# Patient Record
Sex: FEMALE | Race: WHITE | NOT HISPANIC OR LATINO | Employment: FULL TIME | URBAN - METROPOLITAN AREA
[De-identification: names, ages, dates, MRNs, and addresses within clinical notes are randomized per-mention and may not be internally consistent; named-entity substitution may affect disease eponyms.]

---
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
## 2023-12-31 ENCOUNTER — APPOINTMENT (OUTPATIENT)
Dept: GENERAL RADIOLOGY | Facility: CLINIC | Age: 18
End: 2023-12-31
Attending: EMERGENCY MEDICINE
Payer: COMMERCIAL

## 2023-12-31 ENCOUNTER — HOSPITAL ENCOUNTER (EMERGENCY)
Facility: CLINIC | Age: 18
Discharge: HOME | End: 2023-12-31
Attending: EMERGENCY MEDICINE | Admitting: EMERGENCY MEDICINE
Payer: COMMERCIAL

## 2023-12-31 VITALS
SYSTOLIC BLOOD PRESSURE: 104 MMHG | RESPIRATION RATE: 22 BRPM | HEART RATE: 101 BPM | TEMPERATURE: 97.8 F | BODY MASS INDEX: 19.63 KG/M2 | WEIGHT: 115 LBS | DIASTOLIC BLOOD PRESSURE: 91 MMHG | OXYGEN SATURATION: 98 % | HEIGHT: 64 IN

## 2023-12-31 DIAGNOSIS — S42.022A DISPLACED FRACTURE OF SHAFT OF LEFT CLAVICLE, INITIAL ENCOUNTER FOR CLOSED FRACTURE: ICD-10-CM
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
## 2023-12-31 DIAGNOSIS — S80.01XA CONTUSION OF RIGHT KNEE, INITIAL ENCOUNTER: ICD-10-CM

## 2023-12-31 LAB
HOLD SPECIMEN: NORMAL

## 2023-12-31 PROCEDURE — 23500 CLTX CLAVICULAR FX W/O MNPJ: CPT | Mod: LT | Performed by: EMERGENCY MEDICINE

## 2023-12-31 PROCEDURE — 99284 EMERGENCY DEPT VISIT MOD MDM: CPT | Mod: 57 | Performed by: EMERGENCY MEDICINE

## 2023-12-31 PROCEDURE — 250N000011 HC RX IP 250 OP 636: Performed by: EMERGENCY MEDICINE

## 2023-12-31 PROCEDURE — 73030 X-RAY EXAM OF SHOULDER: CPT | Mod: LT

## 2023-12-31 PROCEDURE — 250N000013 HC RX MED GY IP 250 OP 250 PS 637: Performed by: EMERGENCY MEDICINE

## 2023-12-31 PROCEDURE — 96376 TX/PRO/DX INJ SAME DRUG ADON: CPT | Mod: 59 | Performed by: EMERGENCY MEDICINE

## 2023-12-31 PROCEDURE — 73000 X-RAY EXAM OF COLLAR BONE: CPT | Mod: LT

## 2023-12-31 PROCEDURE — 73560 X-RAY EXAM OF KNEE 1 OR 2: CPT | Mod: RT

## 2023-12-31 PROCEDURE — 96374 THER/PROPH/DIAG INJ IV PUSH: CPT | Mod: 59 | Performed by: EMERGENCY MEDICINE

## 2023-12-31 PROCEDURE — 23500 CLTX CLAVICULAR FX W/O MNPJ: CPT | Mod: 54 | Performed by: EMERGENCY MEDICINE

## 2023-12-31 PROCEDURE — 99284 EMERGENCY DEPT VISIT MOD MDM: CPT | Mod: 25 | Performed by: EMERGENCY MEDICINE

## 2023-12-31 PROCEDURE — 96372 THER/PROPH/DIAG INJ SC/IM: CPT | Performed by: EMERGENCY MEDICINE

## 2023-12-31 RX ORDER — OXYCODONE AND ACETAMINOPHEN 5; 325 MG/1; MG/1
1-2 TABLET ORAL EVERY 6 HOURS PRN
Qty: 12 TABLET | Refills: 0 | Status: SHIPPED | OUTPATIENT
Start: 2023-12-31 | End: 2024-01-03

## 2023-12-31 RX ORDER — HYDROMORPHONE HYDROCHLORIDE 1 MG/ML
0.5 INJECTION, SOLUTION INTRAMUSCULAR; INTRAVENOUS; SUBCUTANEOUS
Status: COMPLETED | OUTPATIENT
Start: 2023-12-31 | End: 2023-12-31

## 2023-12-31 RX ORDER — HYDROMORPHONE HYDROCHLORIDE 1 MG/ML
0.5 INJECTION, SOLUTION INTRAMUSCULAR; INTRAVENOUS; SUBCUTANEOUS ONCE
Status: COMPLETED | OUTPATIENT
Start: 2023-12-31 | End: 2023-12-31

## 2023-12-31 RX ORDER — ONDANSETRON 4 MG/1
4 TABLET, ORALLY DISINTEGRATING ORAL ONCE
Status: COMPLETED | OUTPATIENT
Start: 2023-12-31 | End: 2023-12-31

## 2023-12-31 RX ORDER — ONDANSETRON 4 MG/1
4 TABLET, ORALLY DISINTEGRATING ORAL EVERY 8 HOURS PRN
Qty: 10 TABLET | Refills: 0 | Status: SHIPPED | OUTPATIENT
Start: 2023-12-31

## 2023-12-31 RX ORDER — OXYCODONE AND ACETAMINOPHEN 5; 325 MG/1; MG/1
2 TABLET ORAL ONCE
Status: COMPLETED | OUTPATIENT
Start: 2023-12-31 | End: 2023-12-31

## 2023-12-31 RX ADMIN — ONDANSETRON 4 MG: 4 TABLET, ORALLY DISINTEGRATING ORAL at 21:58

## 2023-12-31 RX ADMIN — OXYCODONE HYDROCHLORIDE AND ACETAMINOPHEN 2 TABLET: 5; 325 TABLET ORAL at 21:37

## 2023-12-31 RX ADMIN — HYDROMORPHONE HYDROCHLORIDE 0.5 MG: 1 INJECTION, SOLUTION INTRAMUSCULAR; INTRAVENOUS; SUBCUTANEOUS at 20:41

## 2023-12-31 RX ADMIN — HYDROMORPHONE HYDROCHLORIDE 0.5 MG: 1 INJECTION, SOLUTION INTRAMUSCULAR; INTRAVENOUS; SUBCUTANEOUS at 21:57

## 2023-12-31 RX ADMIN — HYDROMORPHONE HYDROCHLORIDE 0.5 MG: 1 INJECTION, SOLUTION INTRAMUSCULAR; INTRAVENOUS; SUBCUTANEOUS at 19:41

## 2023-12-31 ASSESSMENT — ACTIVITIES OF DAILY LIVING (ADL): ADLS_ACUITY_SCORE: 35

## 2024-01-01 NOTE — ED PROVIDER NOTES
History     Chief Complaint   Patient presents with    Fall     Injuring collar bone while skiing      HPI  History per patient, review of [facility] EMR and Care Everywhere EMR.    Win Simeon is a 18 year old female who presents emergency department with a friend after a fall while snowboarding a short time ago, falling onto the left shoulder area sustaining injury and pain in the left anterior shoulder and clavicle area.  She also has right anterior knee pain without swelling and is able to ambulate and bear weight on the knee.  Unsure of mechanism of injury of the right knee.  There is no knee swelling.  No right lower extremity or left upper extremity neurovascular CMS dysfunction or deficit.  She was helmeted and denies head injury and had no neck or back injury.  She has no shortness of breath, cough or hemoptysis.  No abdominal, hip or pelvis trauma. No other pertinent history or acute complaints or concerns.    Allergies:  No Known Allergies    Problem List:    Patient Active Problem List    Diagnosis Date Noted    Hiatal hernia 06/04/2021     Priority: Medium    Weight loss 05/14/2021     Priority: Medium    Irritable bowel syndrome with diarrhea 05/14/2021     Priority: Medium        Past Medical History:    Past Medical History:   Diagnosis Date    ADHD (attention deficit hyperactivity disorder)     Depression        Past Surgical History:    Past Surgical History:   Procedure Laterality Date    COLONOSCOPY N/A 5/26/2021    Procedure: COLONOSCOPY, WITH BIOPSY;  Surgeon: Geovanna Torrez MD;  Location: [facility]     ESOPHAGOSCOPY, GASTROSCOPY, DUODENOSCOPY (EGD), COMBINED N/A 5/26/2021    Procedure: ESOPHAGOGASTRODUODENOSCOPY, WITH BIOPSY;  Surgeon: Geovanna Torrez MD;  Location: [facility]        Family History:    No family history on file.    Social History:  Marital Status:  Single [1]        Medications:    ondansetron (ZOFRAN ODT) 4 MG ODT tab  oxyCODONE-acetaminophen (PERCOCET) 5-325 MG  "tablet  amphetamine-dextroamphetamine (ADDERALL) 10 MG tablet  hyoscyamine (LEVSIN) 0.125 MG tablet  pantoprazole (PROTONIX) 40 MG EC tablet      Review of Systems  As mentioned in the HPI, in addition focused review of systems was negative.    Physical Exam   BP: 120/80  Pulse: 102  Temp: 98  F (36.7  C)  Resp: 24  Height: 162.6 cm (5' 4\")  Weight: 52.2 kg (115 lb)  SpO2: 94 %      Physical Exam  Vitals and nursing note reviewed.   Constitutional:       General: She is in acute distress.      Appearance: Normal appearance. She is well-developed. She is not ill-appearing, toxic-appearing or diaphoretic.      Comments: Appears anxious and distressed due to pain, otherwise appears normal.   HENT:      Head: Normocephalic. Abrasion present. No raccoon eyes, Barahona's sign or contusion.      Right Ear: External ear normal.      Left Ear: External ear normal.      Nose: Nose normal.      Mouth/Throat:      Mouth: Mucous membranes are moist.   Eyes:      General: No scleral icterus.     Extraocular Movements: Extraocular movements intact.      Conjunctiva/sclera: Conjunctivae normal.   Neck:      Trachea: No tracheal deviation.   Cardiovascular:      Rate and Rhythm: Normal rate and regular rhythm.      Pulses: Normal pulses.      Heart sounds: Normal heart sounds. No murmur heard.     No friction rub. No gallop.   Pulmonary:      Effort: Pulmonary effort is normal. No respiratory distress.      Breath sounds: Normal breath sounds. No stridor. No wheezing, rhonchi or rales.   Chest:      Chest wall: No tenderness (Tenderness is located in the mid clavicle area with palpable deformity, skin intact).       Abdominal:      General: Bowel sounds are normal. There is no distension.      Palpations: Abdomen is soft.      Tenderness: There is no abdominal tenderness. There is no guarding or rebound.   Musculoskeletal:         General: No swelling or deformity.      Left shoulder: No swelling, deformity, effusion, tenderness " (Clavicular tenderness, true tenderness does not appear to be in the shoulder joint itself), bony tenderness or crepitus. Decreased range of motion. Normal strength. Normal pulse.        Arms:       Cervical back: Full passive range of motion without pain, normal range of motion and neck supple. No torticollis, tenderness or crepitus. No pain with movement, spinous process tenderness or muscular tenderness. Normal range of motion.      Thoracic back: Normal. No signs of trauma, tenderness or bony tenderness. Normal range of motion.      Lumbar back: Normal. No signs of trauma, tenderness or bony tenderness. Normal range of motion.      Right knee: No swelling, deformity, effusion, erythema, ecchymosis or crepitus. Normal range of motion. Tenderness (Poorly localized anterior knee tenderness with no contusions, abrasions, effusion or crepitance.) present. No LCL laxity, MCL laxity, ACL laxity or PCL laxity. Normal alignment and normal patellar mobility. Normal pulse.      Instability Tests: Anterior drawer test negative. Posterior drawer test negative.      Right lower leg: Normal. No edema.      Left lower leg: Normal. No edema.        Legs:    Skin:     General: Skin is warm and dry.      Coloration: Skin is not pale.      Findings: No erythema or rash.   Neurological:      General: No focal deficit present.      Mental Status: She is alert and oriented to person, place, and time.      GCS: GCS eye subscore is 4. GCS verbal subscore is 5. GCS motor subscore is 6.      Cranial Nerves: No cranial nerve deficit.      Sensory: Sensation is intact. No sensory deficit.      Motor: Motor function is intact. No weakness.      Coordination: Coordination normal.   Psychiatric:         Behavior: Behavior normal.      Comments: Anxious affect.         ED Course               Procedures            Results for orders placed or performed during the hospital encounter of 12/31/23   XR Shoulder Left 2 Views     Status: None     Narrative    EXAM: XR CLAVICLE LEFT 2 VIEWS, XR SHOULDER LEFT 2 VIEWS  LOCATION: [facility]  DATE: 12/31/2023    INDICATION: Fall, trauma, pain  COMPARISON: None.      Impression    IMPRESSION: Acute fracture of the mid left clavicle with greater than 1 bone width caudal and approximately 1.5 cm medial displacement. The acromioclavicular and glenohumeral joint spaces are preserved and normally aligned.    NOTE: ABNORMAL REPORT    THE DICTATION ABOVE DESCRIBES AN ABNORMALITY FOR WHICH FOLLOW-UP IS NEEDED.    XR Clavicle Left 2 Views     Status: None    Narrative    EXAM: XR CLAVICLE LEFT 2 VIEWS, XR SHOULDER LEFT 2 VIEWS  LOCATION: [facility]  DATE: 12/31/2023    INDICATION: Fall, trauma, pain  COMPARISON: None.      Impression    IMPRESSION: Acute fracture of the mid left clavicle with greater than 1 bone width caudal and approximately 1.5 cm medial displacement. The acromioclavicular and glenohumeral joint spaces are preserved and normally aligned.    NOTE: ABNORMAL REPORT    THE DICTATION ABOVE DESCRIBES AN ABNORMALITY FOR WHICH FOLLOW-UP IS NEEDED.    XR Knee Right 1/2 Views     Status: None    Narrative    EXAM: XR KNEE RIGHT 1/2 VIEWS  LOCATION: [facility]  DATE: 12/31/2023    INDICATION: Fall, trauma, pain  COMPARISON: None.      Impression    IMPRESSION: Normal joint spaces and alignment. No fracture or joint effusion.   [place] Draw     Status: None    Narrative    The following orders were created for panel order [place] Draw.  Procedure                               Abnormality         Status                     ---------                               -----------         ------                     Extra Blue Top Tube[[id number]]                              Final result               Extra Red Top Tube[[id number]]                               Final result               Extra Green Top (Lithium...[[id number]]                       Final result               Extra Purple Top Tube[[id number]]                            Final result                 Please view results for these tests on the individual orders.   Extra Blue Top Tube     Status: None   Result Value Ref Range    Hold Specimen JIC    Extra Red Top Tube     Status: None   Result Value Ref Range    Hold Specimen JIC    Extra Green Top (Lithium Heparin) Tube     Status: None   Result Value Ref Range    Hold Specimen JIC    Extra Purple Top Tube     Status: None   Result Value Ref Range    Hold Specimen JIC            Medications   HYDROmorphone (PF) (DILAUDID) injection 0.5 mg (0.5 mg Intravenous $Given 12/31/23 2041)   oxyCODONE-acetaminophen (PERCOCET) 5-325 MG per tablet 2 tablet (2 tablets Oral $Given 12/31/23 2137)   HYDROmorphone (PF) (DILAUDID) injection 0.5 mg (0.5 mg Intramuscular $Given 12/31/23 2157)   ondansetron (ZOFRAN ODT) ODT tab 4 mg (4 mg Oral $Given 12/31/23 2158)     9:54 PM -she vomited at time of discharge, after Oxycodone/Percocet X 2 tablets.  Will give another dose of Dilaudid IM and Zofran ODT.  Will also Rx Zofran for use at home.      Assessments & Plan (with Medical Decision Making)   18 year old female who fell while snowboarding a short time ago, falling onto the left shoulder area sustaining injury and pain in the left anterior shoulder and clavicle area due to a closed displaced mid left clavicle fracture, and right anterior knee pain which appears to be secondary to a contusion or strain with negative plain films.  CMS/neurovascular function intact.  Left shoulder and arm were immobilized with a shoulder immobilizer and she was provided a sling.  An orthopedic  referral was made for follow-up.  She was prescribed Percocet to use if needed for pain, and Zofran to use if needed for nausea due to the opiate analgesic.  She vomited after taking Percocet in the ED.  She was provided instructions for supportive care and will return as needed for  worsened condition or worsening symptoms, or new problems or concerns.      I have reviewed the nursing notes.    I have reviewed the findings, diagnosis, plan and need for follow up with the patient.      Medical Decision Making: Moderate complexity      Discharge Medication List as of 12/31/2023 10:01 PM        START taking these medications    Details   ondansetron (ZOFRAN ODT) 4 MG ODT tab Take 1 tablet (4 mg) by mouth every 8 hours as needed for nausea, Disp-10 tablet, R-0, [facility]      oxyCODONE-acetaminophen (PERCOCET) 5-325 MG tablet Take 1-2 tablets by mouth every 6 hours as needed for severe pain, Disp-12 tablet, R-0, [facility]             Final diagnoses:   Snowboarding accident, initial encounter   Displaced fracture of shaft of left clavicle, initial encounter for closed fracture   Contusion of right knee, initial encounter       12/31/2023   [facility] EMERGENCY DEPT       Nima Curtis MD  01/02/24 0997

## 2024-01-01 NOTE — ED TRIAGE NOTES
Patient was snow boarding at high speeds and hit a rough spot and went flying she was wearing a helmet the  saw her at scene and put a make shift sling on     Triage Assessment (Adult)       Row Name 12/31/23 4806          Triage Assessment    Airway WDL WDL        Respiratory WDL    Respiratory WDL WDL        Skin Circulation/Temperature WDL    Skin Circulation/Temperature WDL WDL        Cardiac WDL    Cardiac WDL X;rhythm     Pulse Rate & Regularity tachycardic        Peripheral/Neurovascular WDL    Peripheral Neurovascular WDL --  requires further assessment        Cognitive/Neuro/Behavioral WDL    Cognitive/Neuro/Behavioral WDL WDL

## 2024-01-01 NOTE — ED NOTES
Comes from Snowboarding.     Today fell snowboarding. Wearing a helmet. Complains of a headache all over, left shoulder/clavicle pain, and right knee pain (especially when she tries to straighten her leg.     Headache all over, and pain in the neck that is a tightness on the sides especially the left. No pain down the middle of the neck to palpation or rest of back. . No radiation or pain or numbness tingling.       Pain to the left shoulder especially over the bra strap area over the clavicle. CMS is present at the wrist and equal bilateral. No SOB, lung sounds clear bilateral. No pain to chest palpation or subQ air felt. No abdomen pain at rest or palpation. Bowel sound present. Able to walk since incident. Pain in the right knee, especially when trying to straighten. CMS distal equal to left from right.

## 2024-01-02 NOTE — PROGRESS NOTES
Chief Complaint:   Chief Complaint   Patient presents with    Left Shoulder - Pain     Clavicle fracture. DOI 12/31/23, 3 day s/p. Patient was snowboarding and took a fall onto the left shoulder. She was seen in ER. She was placed in a sling. Her shoulder is very painful. She wakes in the morning crying due to pain. She takes 1 oxycodone and 1 advil every 6 hours.       INJURY: left clavicle shaft fracture, displaced and shortened  DATE of INJURY: 12/31/2023        HPI: Win Simeon is a 18 year old female , right -hand dominant, who presents for evaluation and management of a left clavicle fracture. The injury occurred on 12/31/2023 while fall while snowboarding at [facility] falling onto the left shoulder area sustaining injury and pain in the left anterior shoulder and clavicle area.    It has been 3 days since the initial injury. Continues with bad clavicle pain. Taking oxycodone every 6 hours. Also ibuprofen.    Occasional numbness and tingling.    Some headaches, but gets migraines.    Bumped her right knee as well, but that is improving.      She reports having severe  pain/discomfort around the injury site. She denies numbness or tingling.   Pain severity: 9/10  Pain quality: dull, aching, sharp, burning, and shooting  Frequency of symptoms: are constant  Associated symptoms: numbness/tingling     Prior history of related problems: no prior problems with this area in the past      Past medical history:  has a past medical history of ADHD (attention deficit hyperactivity disorder) and Depression.     Past surgical history:  has a past surgical history that includes Esophagoscopy, gastroscopy, duodenoscopy (EGD), combined (N/A, 5/26/2021) and Colonoscopy (N/A, 5/26/2021).     Medications:   Current Outpatient Medications:     clindamycin (CLEOCIN T) 1 % external lotion, Apply sparingly to affected area twice daily or as directed, Disp: , Rfl:     doxycycline hyclate (PERIOSTAT) 20 MG tablet, TAKE 1 TABLET  BY MOUTH TWICE DAILY FOR DAILY MAINTENANCE OF ACNE, Disp: , Rfl:     levonorgestrel (MIRENA) 52 MG (20 mcg/day) IUD, 1 each by Intrauterine route, Disp: , Rfl:     ondansetron (ZOFRAN ODT) 4 MG ODT tab, Take 1 tablet (4 mg) by mouth every 8 hours as needed for nausea, Disp: 10 tablet, Rfl: 0    SENNA-docusate sodium (SENNA S) 8.6-50 MG tablet, Take 2 tablets by mouth 2 times daily as needed (constipation.), Disp: 30 tablet, Rfl: 0    tretinoin (RETIN-A) 0.025 % external cream, APPLY SPARINGLY TO THE FACE AND BUTTOCK EVERY NIGHT FOR ACNE, Disp: , Rfl:     amphetamine-dextroamphetamine (ADDERALL) 10 MG tablet, Take 10 mg by mouth daily Take one tablet once daily -three days a week (Patient not taking: Reported on 1/3/2024), Disp: , Rfl:     hyoscyamine (LEVSIN) 0.125 MG tablet, Take one tablet 15-30 min before breakfast and dinner (Patient not taking: Reported on 1/3/2024), Disp: 60 tablet, Rfl: 1    oxyCODONE-acetaminophen (PERCOCET) 5-325 MG tablet, Take 1 tablet by mouth every 6 hours as needed for severe pain, Disp: 12 tablet, Rfl: 0    pantoprazole (PROTONIX) 40 MG EC tablet, Take 1 tablet (40 mg) by mouth 2 times daily (before meals) (Patient not taking: Reported on 1/3/2024), Disp: 60 tablet, Rfl: 1    valACYclovir (VALTREX) 1000 mg tablet, TAKE 2 TABLETS BY MOUTH AT ONSET OF COLD SORE, THEN TAKE 2 TABLETS BY MOUTH 12 HOURS LATER. (Patient not taking: Reported on 1/3/2024), Disp: , Rfl:      Allergies:   No Known Allergies     Family History: family history is not on file.     Social History: gutters.  reports that she has never smoked. She uses smokeless tobacco.    Review of Systems:  ROS: 10 point ROS neg other than the symptoms noted above in the HPI and past medical history.    Physical Exam  GENERAL APPEARANCE: healthy, alert, no distress.   SKIN: no suspicious lesions or rashes  RESPIRATORY: No increased work of breathing.  NEURO: Normal strength and tone, mentation intact and speech normal  PSYCH:   "mentation appears normal and affect normal. Not anxious.  Hands: no clubbing, nail pitting or nodes.    /79   Pulse 111   Ht 1.626 m (5' 4\")   Wt 52.9 kg (116 lb 9.6 oz)   BMI 20.01 kg/m         left UPPER EXTREMITY:  The sling was in place.  There is mild swelling in the left clavicle regaion.  There is moderate tenderness in the mid left clavicle at palpable fracture site. There is mild prominence. Skin intact. No skin tenting..  There is no ecchymosis.  There is no erythema of the surrounding skin.  There is no maceration of the skin.  There is mild prominence deformity in the area.    Sensation intact to light touch in median, radial, ulnar and axillary nerve distributions  Palpable 2+ radial pulse, brisk capillary refill to all fingers, wwp  Intact epl fpl fdp edc wrist flexion/extension biceps triceps deltoid    ELBOW:  Inspection: skin intact. No open wounds or abrasions. No abnormal skin discoloration, erythema or ecchymosis.  Nontender to palpation. No crepitus to palpation.  Full, pain-free range of motion.    WRIST:  Inspection: skin intact. No open wounds or abrasions. No abnormal skin discoloration, erythema or ecchymosis.  Nontender to palpation. No crepitus to palpation.  Full, pain-free range of motion.      X-rays:  2 views left clavicle, 2 views left shoulder from 12/31/2023 were reviewed in clinic today.  Acute fracture of the mid left clavicle with greater than 1 bone width caudal and approximately 1.5 cm medial displacement. The acromioclavicular and glenohumeral joint spaces are preserved and normally aligned. .    Assessment:: 18 year old female ,left -hand dominant, with left clavicle fracture, displaced and shortened..    Plan:   * discussed with patient injury, has a clavicle (collar bone) fracture. Treatment options historically has been nonoperative treatment with a period of sling immobilization (4-6 weeks), followed by gradual shoulder range of motion exercises, and returning " to activities once the fracture has healed. Depending on the severity of the injury, high success of healing, although fracture malunion rate is high. Typically, this is not a problem, other than cosmetic. Surgery has become more indicated for fractures that have a higher chance of not healing with standard nonoperative treatment, such as fractures that are open, shortened > 2cm, completely displaced.  I did discuss that her fracture falls into the category of displaced and shortened, could be considered for surgical treatment.  Risks and perceived benefits of both nonsurgical versus surgical options discussed.    At this time, she'd like to think about her options and get back to us.    In the meantime, we will continue with sling immobilization non weight bearing left upper extremity.  Over the counter pain medications versus percocet. A new prescription of percocet escribed today.  Return to clinic 4 weeks, sooner if needed or if decides on surgery.    * All questions were addressed and answered prior to discharge from clinic today. The patient acknowledges an understanding of and agreement with the plan set forth during today's visit. Patient was advised to call our office or MyChart us if any further questions arise upon leaving our office today.        Bhavik Pavon M.D., M.S.  Dept. of Orthopaedic Surgery  [facility]

## 2024-01-03 ENCOUNTER — OFFICE VISIT (OUTPATIENT)
Dept: ORTHOPEDICS | Facility: CLINIC | Age: 19
End: 2024-01-03
Payer: COMMERCIAL

## 2024-01-03 ENCOUNTER — TELEPHONE (OUTPATIENT)
Dept: SURGERY | Facility: CLINIC | Age: 19
End: 2024-01-03

## 2024-01-03 VITALS
WEIGHT: 116.6 LBS | HEIGHT: 64 IN | BODY MASS INDEX: 19.91 KG/M2 | DIASTOLIC BLOOD PRESSURE: 79 MMHG | HEART RATE: 111 BPM | SYSTOLIC BLOOD PRESSURE: 125 MMHG

## 2024-01-03 DIAGNOSIS — S42.022A DISPLACED FRACTURE OF SHAFT OF LEFT CLAVICLE, INITIAL ENCOUNTER FOR CLOSED FRACTURE: Primary | ICD-10-CM

## 2024-01-03 PROCEDURE — 99203 OFFICE O/P NEW LOW 30 MIN: CPT | Performed by: ORTHOPAEDIC SURGERY

## 2024-01-03 RX ORDER — CLINDAMYCIN PHOSPHATE 10 UG/ML
LOTION TOPICAL
COMMUNITY
Start: 2023-06-19

## 2024-01-03 RX ORDER — SENNA AND DOCUSATE SODIUM 50; 8.6 MG/1; MG/1
2 TABLET, FILM COATED ORAL 2 TIMES DAILY PRN
Qty: 30 TABLET | Refills: 0 | Status: SHIPPED | OUTPATIENT
Start: 2024-01-03

## 2024-01-03 RX ORDER — VALACYCLOVIR HYDROCHLORIDE 1 G/1
TABLET, FILM COATED ORAL
COMMUNITY
Start: 2022-01-13

## 2024-01-03 RX ORDER — OXYCODONE AND ACETAMINOPHEN 5; 325 MG/1; MG/1
1 TABLET ORAL EVERY 6 HOURS PRN
Qty: 12 TABLET | Refills: 0 | Status: SHIPPED | OUTPATIENT
Start: 2024-01-03

## 2024-01-03 RX ORDER — DOXYCYCLINE HYCLATE 20 MG
TABLET ORAL
COMMUNITY
Start: 2023-11-09

## 2024-01-03 RX ORDER — TRETINOIN 0.25 MG/G
CREAM TOPICAL
COMMUNITY
Start: 2023-11-09

## 2024-01-03 ASSESSMENT — PAIN SCALES - GENERAL: PAINLEVEL: MODERATE PAIN (5)

## 2024-01-03 NOTE — TELEPHONE ENCOUNTER
Pt calling in today to get a refill on her Oxy 5mg. Pharmacy: [facility] in [place] on [address]. She's in a lot of pain and only has 3 pills left. Is there anything else she can take in the meantime in case she runs out?

## 2024-01-03 NOTE — TELEPHONE ENCOUNTER
Called and spoke to Win, a prescription for percocet escrbed to desired pharmacy.    Bhavik Pavon M.D., M.S.  Dept. of Orthopaedic Surgery  [facility]

## 2024-01-03 NOTE — LETTER
1/3/2024         RE: Win Simeon  [id number] [facility] [id number]        Dear Colleague,    Thank you for referring your patient, Win Simeon, to the [facility]. Please see a copy of my visit note below.    Chief Complaint:   Chief Complaint   Patient presents with     Left Shoulder - Pain     Clavicle fracture. DOI 12/31/23, 3 day s/p. Patient was snowboarding and took a fall onto the left shoulder. She was seen in ER. She was placed in a sling. Her shoulder is very painful. She wakes in the morning crying due to pain. She takes 1 oxycodone and 1 advil every 6 hours.       INJURY: left clavicle shaft fracture, displaced and shortened  DATE of INJURY: 12/31/2023        HPI: Win Simeon is a 18 year old female , right -hand dominant, who presents for evaluation and management of a left clavicle fracture. The injury occurred on 12/31/2023 while fall while snowboarding at [facility] falling onto the left shoulder area sustaining injury and pain in the left anterior shoulder and clavicle area.    It has been 3 days since the initial injury. Continues with bad clavicle pain. Taking oxycodone every 6 hours. Also ibuprofen.    Occasional numbness and tingling.    Some headaches, but gets migraines.    Bumped her right knee as well, but that is improving.      She reports having severe  pain/discomfort around the injury site. She denies numbness or tingling.   Pain severity: 9/10  Pain quality: dull, aching, sharp, burning, and shooting  Frequency of symptoms: are constant  Associated symptoms: numbness/tingling     Prior history of related problems: no prior problems with this area in the past      Past medical history:  has a past medical history of ADHD (attention deficit hyperactivity disorder) and Depression.     Past surgical history:  has a past surgical history that includes Esophagoscopy, gastroscopy, duodenoscopy (EGD), combined (N/A, 5/26/2021) and Colonoscopy (N/A,  5/26/2021).     Medications:   Current Outpatient Medications:      clindamycin (CLEOCIN T) 1 % external lotion, Apply sparingly to affected area twice daily or as directed, Disp: , Rfl:      doxycycline hyclate (PERIOSTAT) 20 MG tablet, TAKE 1 TABLET BY MOUTH TWICE DAILY FOR DAILY MAINTENANCE OF ACNE, Disp: , Rfl:      levonorgestrel (MIRENA) 52 MG (20 mcg/day) IUD, 1 each by Intrauterine route, Disp: , Rfl:      ondansetron (ZOFRAN ODT) 4 MG ODT tab, Take 1 tablet (4 mg) by mouth every 8 hours as needed for nausea, Disp: 10 tablet, Rfl: 0     SENNA-docusate sodium (SENNA S) 8.6-50 MG tablet, Take 2 tablets by mouth 2 times daily as needed (constipation.), Disp: 30 tablet, Rfl: 0     tretinoin (RETIN-A) 0.025 % external cream, APPLY SPARINGLY TO THE FACE AND BUTTOCK EVERY NIGHT FOR ACNE, Disp: , Rfl:      amphetamine-dextroamphetamine (ADDERALL) 10 MG tablet, Take 10 mg by mouth daily Take one tablet once daily -three days a week (Patient not taking: Reported on 1/3/2024), Disp: , Rfl:      hyoscyamine (LEVSIN) 0.125 MG tablet, Take one tablet 15-30 min before breakfast and dinner (Patient not taking: Reported on 1/3/2024), Disp: 60 tablet, Rfl: 1     oxyCODONE-acetaminophen (PERCOCET) 5-325 MG tablet, Take 1 tablet by mouth every 6 hours as needed for severe pain, Disp: 12 tablet, Rfl: 0     pantoprazole (PROTONIX) 40 MG EC tablet, Take 1 tablet (40 mg) by mouth 2 times daily (before meals) (Patient not taking: Reported on 1/3/2024), Disp: 60 tablet, Rfl: 1     valACYclovir (VALTREX) 1000 mg tablet, TAKE 2 TABLETS BY MOUTH AT ONSET OF COLD SORE, THEN TAKE 2 TABLETS BY MOUTH 12 HOURS LATER. (Patient not taking: Reported on 1/3/2024), Disp: , Rfl:      Allergies:   No Known Allergies     Family History: family history is not on file.     Social History: gutters.  reports that she has never smoked. She uses smokeless tobacco.    Review of Systems:  ROS: 10 point ROS neg other than the symptoms noted above in the HPI  "and past medical history.    Physical Exam  GENERAL APPEARANCE: healthy, alert, no distress.   SKIN: no suspicious lesions or rashes  RESPIRATORY: No increased work of breathing.  NEURO: Normal strength and tone, mentation intact and speech normal  PSYCH:  mentation appears normal and affect normal. Not anxious.  Hands: no clubbing, nail pitting or nodes.    /79   Pulse 111   Ht 1.626 m (5' 4\")   Wt 52.9 kg (116 lb 9.6 oz)   BMI 20.01 kg/m         left UPPER EXTREMITY:  The sling was in place.  There is mild swelling in the left clavicle regaion.  There is moderate tenderness in the mid left clavicle at palpable fracture site. There is mild prominence. Skin intact. No skin tenting..  There is no ecchymosis.  There is no erythema of the surrounding skin.  There is no maceration of the skin.  There is mild prominence deformity in the area.    Sensation intact to light touch in median, radial, ulnar and axillary nerve distributions  Palpable 2+ radial pulse, brisk capillary refill to all fingers, wwp  Intact epl fpl fdp edc wrist flexion/extension biceps triceps deltoid    ELBOW:  Inspection: skin intact. No open wounds or abrasions. No abnormal skin discoloration, erythema or ecchymosis.  Nontender to palpation. No crepitus to palpation.  Full, pain-free range of motion.    WRIST:  Inspection: skin intact. No open wounds or abrasions. No abnormal skin discoloration, erythema or ecchymosis.  Nontender to palpation. No crepitus to palpation.  Full, pain-free range of motion.      X-rays:  2 views left clavicle, 2 views left shoulder from 12/31/2023 were reviewed in clinic today.  Acute fracture of the mid left clavicle with greater than 1 bone width caudal and approximately 1.5 cm medial displacement. The acromioclavicular and glenohumeral joint spaces are preserved and normally aligned. .    Assessment:: 18 year old female ,left -hand dominant, with left clavicle fracture, displaced and shortened..    Plan: "   * discussed with patient injury, has a clavicle (collar bone) fracture. Treatment options historically has been nonoperative treatment with a period of sling immobilization (4-6 weeks), followed by gradual shoulder range of motion exercises, and returning to activities once the fracture has healed. Depending on the severity of the injury, high success of healing, although fracture malunion rate is high. Typically, this is not a problem, other than cosmetic. Surgery has become more indicated for fractures that have a higher chance of not healing with standard nonoperative treatment, such as fractures that are open, shortened > 2cm, completely displaced.  I did discuss that her fracture falls into the category of displaced and shortened, could be considered for surgical treatment.  Risks and perceived benefits of both nonsurgical versus surgical options discussed.    At this time, she'd like to think about her options and get back to us.    In the meantime, we will continue with sling immobilization non weight bearing left upper extremity.  Over the counter pain medications versus percocet. A new prescription of percocet escribed today.  Return to clinic 4 weeks, sooner if needed or if decides on surgery.    * All questions were addressed and answered prior to discharge from clinic today. The patient acknowledges an understanding of and agreement with the plan set forth during today's visit. Patient was advised to call our office or MyChart us if any further questions arise upon leaving our office today.        Bhavik Pavon M.D., M.S.  Dept. of Orthopaedic Surgery  [facility]       Again, thank you for allowing me to participate in the care of your patient.        Sincerely,        Bhavik Pavon MD

## 2024-01-03 NOTE — TELEPHONE ENCOUNTER
Patient had an office visit today 1/3/24.    Routing to Dr. Pavon and team to advise.    Ethel FOX RN, BSN, PHN

## (undated) DEVICE — SOL WATER IRRIG 1000ML BOTTLE 2F7114

## (undated) DEVICE — ENDO BITE BLOCK PEDS BATRIK LATEX FREE B1

## (undated) DEVICE — ENDO FORCEP ENDOJAW BIOPSY 2.8MMX230CM FB-220U

## (undated) DEVICE — PAD CHUX UNDERPAD 30X36" P3036C

## (undated) DEVICE — KIT CONNECTOR FOR OLYMPUS ENDOSCOPES DEFENDO 100310

## (undated) DEVICE — TUBING SUCTION MEDI-VAC 1/4"X20' N620A

## (undated) DEVICE — SPECIMEN CONTAINER W/20ML 10% BUFF FORMALIN C4322-11

## (undated) DEVICE — TUBING ENDOGATOR HYBRID IRRIG 100610 EGP-100

## (undated) DEVICE — KIT ENDO TURNOVER/PROCEDURE CARRY-ON 101822

## (undated) RX ORDER — PROPOFOL 10 MG/ML
INJECTION, EMULSION INTRAVENOUS
Status: DISPENSED
Start: 2021-05-26

## (undated) RX ORDER — FENTANYL CITRATE 50 UG/ML
INJECTION, SOLUTION INTRAMUSCULAR; INTRAVENOUS
Status: DISPENSED
Start: 2021-05-26

## (undated) RX ORDER — EPHEDRINE SULFATE 50 MG/ML
INJECTION, SOLUTION INTRAMUSCULAR; INTRAVENOUS; SUBCUTANEOUS
Status: DISPENSED
Start: 2021-05-26